# Patient Record
Sex: MALE | Race: AMERICAN INDIAN OR ALASKA NATIVE
[De-identification: names, ages, dates, MRNs, and addresses within clinical notes are randomized per-mention and may not be internally consistent; named-entity substitution may affect disease eponyms.]

---

## 2021-12-31 ENCOUNTER — HOSPITAL ENCOUNTER (EMERGENCY)
Dept: HOSPITAL 5 - ED | Age: 46
LOS: 4 days | Discharge: STILL ADMITTED OR ADMINISTRATIVE | End: 2022-01-04
Payer: SELF-PAY

## 2021-12-31 DIAGNOSIS — U07.1: Primary | ICD-10-CM

## 2021-12-31 DIAGNOSIS — Z79.899: ICD-10-CM

## 2021-12-31 DIAGNOSIS — R45.851: ICD-10-CM

## 2021-12-31 DIAGNOSIS — E11.9: ICD-10-CM

## 2021-12-31 LAB
ALBUMIN SERPL-MCNC: 3.6 G/DL (ref 3.9–5)
ALT SERPL-CCNC: 29 UNITS/L (ref 7–56)
BASOPHILS # (AUTO): 0.1 K/MM3 (ref 0–0.1)
BASOPHILS NFR BLD AUTO: 0.7 % (ref 0–1.8)
BUN SERPL-MCNC: 17 MG/DL (ref 9–20)
BUN/CREAT SERPL: 14 %
CALCIUM SERPL-MCNC: 8.3 MG/DL (ref 8.4–10.2)
EOSINOPHIL # BLD AUTO: 0.2 K/MM3 (ref 0–0.4)
EOSINOPHIL NFR BLD AUTO: 2.3 % (ref 0–4.3)
HCT VFR BLD CALC: 37.1 % (ref 35.5–45.6)
HEMOLYSIS INDEX: 9
HGB BLD-MCNC: 12.2 GM/DL (ref 11.8–15.2)
LYMPHOCYTES # BLD AUTO: 2.2 K/MM3 (ref 1.2–5.4)
LYMPHOCYTES NFR BLD AUTO: 28.6 % (ref 13.4–35)
MCHC RBC AUTO-ENTMCNC: 33 % (ref 32–34)
MCV RBC AUTO: 86 FL (ref 84–94)
MONOCYTES # (AUTO): 0.6 K/MM3 (ref 0–0.8)
MONOCYTES % (AUTO): 7.6 % (ref 0–7.3)
PLATELET # BLD: 206 K/MM3 (ref 140–440)
RBC # BLD AUTO: 4.33 M/MM3 (ref 3.65–5.03)

## 2021-12-31 PROCEDURE — 36415 COLL VENOUS BLD VENIPUNCTURE: CPT

## 2021-12-31 PROCEDURE — 82962 GLUCOSE BLOOD TEST: CPT

## 2021-12-31 PROCEDURE — 96372 THER/PROPH/DIAG INJ SC/IM: CPT

## 2021-12-31 PROCEDURE — 85025 COMPLETE CBC W/AUTO DIFF WBC: CPT

## 2021-12-31 PROCEDURE — U0003 INFECTIOUS AGENT DETECTION BY NUCLEIC ACID (DNA OR RNA); SEVERE ACUTE RESPIRATORY SYNDROME CORONAVIRUS 2 (SARS-COV-2) (CORONAVIRUS DISEASE [COVID-19]), AMPLIFIED PROBE TECHNIQUE, MAKING USE OF HIGH THROUGHPUT TECHNOLOGIES AS DESCRIBED BY CMS-2020-01-R: HCPCS

## 2021-12-31 PROCEDURE — 80307 DRUG TEST PRSMV CHEM ANLYZR: CPT

## 2021-12-31 PROCEDURE — 80320 DRUG SCREEN QUANTALCOHOLS: CPT

## 2021-12-31 PROCEDURE — 80053 COMPREHEN METABOLIC PANEL: CPT

## 2021-12-31 PROCEDURE — 81001 URINALYSIS AUTO W/SCOPE: CPT

## 2021-12-31 PROCEDURE — G0480 DRUG TEST DEF 1-7 CLASSES: HCPCS

## 2021-12-31 PROCEDURE — 99284 EMERGENCY DEPT VISIT MOD MDM: CPT

## 2021-12-31 NOTE — EMERGENCY DEPARTMENT REPORT
ED Psych HPI





- General


Chief Complaint: Psych


Stated Complaint: SUICIDAL


Time Seen by Provider: 12/31/21 20:47


Source: EMS


Mode of arrival: Stretcher





- History of Present Illness


Initial Comments: 





Patient came in by EMS due to suicidal ideation.  He states that he is been off 

of his Prozac for 3 to 4 months.  He took 40 mg a day.  He had just not been 

compliant.  He is contemplating suicide and has been for 2 to 3 days.  Tonight, 

he plan to get a bunch of "heroin and cocaine and mix it together and just go to

sleep and not wake up."  He has tried suicide before.  He attempted to hang 

himself years ago.  He has had family members that have tried to kill himself.  

He is not hearing voices that are telling him to harm himself.  He does admit 

that he was at [facility] for the same.  He was released at 

approximately 3:00 today.  He states that they "just put him in a room and then 

kicked him out."





ED Review of Systems


ROS: 


Stated complaint: SUICIDAL


Other details as noted in HPI





Comment: All other systems reviewed and negative


Constitutional: denies: fever


Eyes: denies: eye pain


ENT: denies: throat pain


Respiratory: denies: cough


Cardiovascular: denies: chest pain


Endocrine: denies: unexplained weight loss


Gastrointestinal: denies: abdominal pain


Genitourinary: denies: dysuria


Musculoskeletal: denies: back pain


Skin: denies: rash


Neurological: denies: headache


Psychiatric: as per HPI


Hematological/Lymphatic: denies: easy bruising





ED Past Medical Hx





- Past Medical History


Hx Diabetes: Yes


Hx Psychiatric Treatment: Yes





- Family History


Family history: diabetes, other (Psychiatric disease)





ED Physical Exam





- General


Limitations: No Limitations, Other (Pulse ox noted and normal per EMS)


General appearance: alert, in no apparent distress





- Head


Head exam: Present: atraumatic, normocephalic





- Eye


Eye exam: Present: normal appearance, EOMI





- ENT


ENT exam: Present: normal orophraynx, normal external ear exam





- Neck


Neck exam: Present: normal inspection.  Absent: meningismus





- Respiratory


Respiratory exam: Present: normal lung sounds bilaterally.  Absent: respiratory 

distress





- Cardiovascular


Cardiovascular Exam: Present: regular rate, normal rhythm





- GI/Abdominal


GI/Abdominal exam: Present: soft.  Absent: tenderness





- Extremities Exam


Extremities exam: Present: normal capillary refill.  Absent: calf tenderness





- Back Exam


Back exam: Absent: CVA tenderness (R), CVA tenderness (L)





- Neurological Exam


Neurological exam: Present: alert, oriented X3, CN II-XII intact, normal gait.  

Absent: motor sensory deficit





- Psychiatric


Psychiatric exam: Present: normal affect, normal mood, suicidal ideation, other 

(Good eye contact)





- Skin


Skin exam: Present: warm, dry





ED Course





- Reevaluation(s)


Reevaluation #1: 





12/31/21 20:48


EMS was met upon arrival.  Labs were ordered.  Old records noted.


Reevaluation #2: 





12/31/21 22:07


Labs are noted.  Case was discussed with psychiatric services.  They believe 

that admission is appropriate.  1013 and coronavirus testing have been 

completed.





ED Medical Decision Making





- Lab Data


Result diagrams: 


                                 12/31/21 20:54





                                 12/31/21 20:54





- Medical Decision Making





Patient presented with suicidal ideation and intent.  He has been placed on a 1

013.  Has been medically cleared.  Psychiatric services will attempt placement. 

There is no evidence of acute delusion or psychosis.


Critical Care Time: No


Critical care attestation.: 


If time is entered above; I have spent that time in minutes in the direct care 

of this critically ill patient, excluding procedure time.








ED Disposition


Clinical Impression: 


 Suicidal ideation





Disposition: 30 STILL A PATIENT


Is pt being admited?: No


Condition: Stable

## 2022-01-01 LAB
BILIRUB UR QL STRIP: (no result)
BLOOD UR QL VISUAL: (no result)
PH UR STRIP: 7 [PH] (ref 5–7)
PROT UR STRIP-MCNC: (no result) MG/DL
RBC #/AREA URNS HPF: < 1 /HPF (ref 0–6)
UROBILINOGEN UR-MCNC: < 2 MG/DL (ref ?–2)
WBC #/AREA URNS HPF: 1 /HPF (ref 0–6)

## 2022-01-01 RX ADMIN — DOXEPIN HYDROCHLORIDE SCH MG: 10 CAPSULE ORAL at 22:55

## 2022-01-01 NOTE — EMERGENCY DEPARTMENT REPORT
Blank Doc





- Documentation


Documentation: 


Chart and nurses notes reviewed


46-year-old male with bipolar disorder on 1013 for psychosis with homicidal and 

suicidal ideation.  Covid test result pending.  No events overnight.  Vital 

signs stable.  P.o. meds initiated by mental health and placement pending.

## 2022-01-01 NOTE — CONSULTATION
History of Present Illness





- Reason for Consult


Consult date: 01/01/22


Reason for consult: suicidal





- History of Present Psychiatric Illness


The patient was seen today. He is a 47y/o male patient who presented to the ER 

for suicidal thoughts over last 3 days. He says he's been off of his meds for 

about three weeks. He could only remember the prozac. He says the suicidal 

thoughts are getting stronger. The patient says he hasn't been sleeping and has 

nightmares. He says he's hearing voices telling him to kill himself and others. 

The patient says he's seeing dead people. He says he has a history of doing 

cocaine but hadn't done it in about  a month. The patient says he's homeless and

unemployed. He says he's trying to get his disability. 





REVIEW OF SYSTEMS  


Constitutional: Negative for weight loss


ENT: Negative for stridor


Respiratory: Negative for cough or hemoptysis


All other systems reviewed and are negative





MENTAL STATUS EXAMINATION


General Appearance and Behavior: Age appropriate, good hygiene, wearing 

appropriate clothes. calm, cooperative


Cooperation: Cooperative


Psychomotor Behavior: Psychomotor normal


Mood: Depressed


Affect and affective range: congruent with stated mood


Thought Process: goal directed


Thought Content: Depression, hallucinations


Speech: Normal tone and pace


Suicidal Ideation: Yes


Homicidal Ideation: Denies


Hallucinations: Auditory/Visual 


Delusions: None elicited


Impulse Control: Limited


Insight and Judgment: Limited insight and fair judgment


Memory: normal


Attention: Attentive


Orientation: a/o x 3





 Assessment 


(1) Bipolar Disorder





Treatment Plan


1013


Risperidone 0.25mg po BID


Doxepin 10mg po qhs


Prozac 40mg po daily


Disposition: Recommend acute psychiatric inpatient treatment


Will follow. Thanks


Case staffed with Dr. Messer








Medications and Allergies


                                    Allergies











Allergy/AdvReac Type Severity Reaction Status Date / Time


 


No Known Allergies Allergy   Verified 12/31/21 22:22











                                Home Medications











 Medication  Instructions  Recorded  Confirmed  Last Taken  Type


 


FLUoxetine HCL [PROzac] 40 mg PO QDAY 01/01/22 01/01/22 Unknown History














Mental Status Exam





- Vital signs


                                Last Vital Signs











Temp  98.4 F   01/01/22 09:39


 


Pulse  98 H  01/01/22 09:39


 


Resp  18   01/01/22 09:39


 


BP  110/54   01/01/22 09:39


 


Pulse Ox  98   01/01/22 09:39














Results


Result Diagrams: 


                                 12/31/21 20:54





                                 12/31/21 20:54


                              Abnormal lab results











  12/31/21 12/31/21 Range/Units





  20:54 20:54 


 


Mono % (Auto)  7.6 H   (0.0-7.3)  %


 


Glucose   303 H  ()  mg/dL


 


Calcium   8.3 L  (8.4-10.2)  mg/dL


 


Albumin   3.6 L  (3.9-5)  g/dL








All other labs normal.

## 2022-01-02 RX ADMIN — DOXEPIN HYDROCHLORIDE SCH MG: 10 CAPSULE ORAL at 22:18

## 2022-01-02 NOTE — PROGRESS NOTE
Subjective





- Reason for Consult


Consult date: 01/02/22


Reason for consult: SI





- Chief Complaint


Chief complaint: 


The patient was seen today. He says he is depressed, and still having suicidal 

thoughts. The patient also verbalizes seeing dead people. 





REVIEW OF SYSTEMS  


Constitutional: Negative for weight loss


ENT: Negative for stridor


Respiratory: Negative for cough or hemoptysis


All other systems reviewed and are negative





MENTAL STATUS EXAMINATION


General Appearance and Behavior: Age appropriate, good hygiene, wearing 

appropriate clothes. calm, cooperative


Cooperation: Cooperative


Psychomotor Behavior: Psychomotor normal


Mood: Depressed


Affect and affective range: congruent with stated mood


Thought Process: goal directed


Thought Content: Depression, hallucinations


Speech: Normal tone and pace


Suicidal Ideation: Yes


Homicidal Ideation: Denies


Hallucinations: Auditory/Visual 


Delusions: None elicited


Impulse Control: Limited


Insight and Judgment: Limited insight and fair judgment


Memory: normal


Attention: Attentive


Orientation: a/o x 3





 Assessment 


(1) Bipolar Disorder





Treatment Plan


1013


Increase Risperidone 0.50mg po BID


Continue Doxepin 10mg po qhs


Continue Prozac 40mg po daily


Disposition: Recommend acute psychiatric inpatient treatment


Will follow. Thanks


Case staffed with Dr. Messer








Mental Status Exam





- Vital signs


                                Last Vital Signs











Temp  98.1 F   01/01/22 19:57


 


Pulse  89   01/01/22 19:57


 


Resp  18   01/01/22 19:57


 


BP  111/51   01/01/22 19:57


 


Pulse Ox  96   01/01/22 19:57

## 2022-01-02 NOTE — EVENT NOTE
Date: 01/02/22





Patient is 46 years old male with history of suicidal ideation.


Vital signs stable.


Labs reviewed and is unremarkable.


Waiting for inpatient psychiatric admission.

## 2022-01-03 RX ADMIN — INSULIN HUMAN SCH UNITS: 100 INJECTION, SOLUTION PARENTERAL at 14:42

## 2022-01-03 RX ADMIN — GABAPENTIN SCH MG: 300 CAPSULE ORAL at 15:29

## 2022-01-03 RX ADMIN — INSULIN HUMAN SCH UNITS: 100 INJECTION, SOLUTION PARENTERAL at 16:20

## 2022-01-03 RX ADMIN — INSULIN HUMAN SCH: 100 INJECTION, SOLUTION PARENTERAL at 23:16

## 2022-01-03 RX ADMIN — GABAPENTIN SCH MG: 300 CAPSULE ORAL at 22:14

## 2022-01-03 RX ADMIN — GABAPENTIN SCH MG: 300 CAPSULE ORAL at 12:13

## 2022-01-03 NOTE — PROGRESS NOTE
Subjective





- Reason for Consult


Consult date: 01/03/22


Reason for consult: SI





- Chief Complaint


Chief complaint: 


The patient was seen today. He says he is still depressed, and still having 

suicidal thoughts. He says he has a plan to OD. The patient denies 

hallucinations of any kind today. He says he slept on and off. 





REVIEW OF SYSTEMS  


Constitutional: Negative for weight loss


ENT: Negative for stridor


Respiratory: Negative for cough or hemoptysis


All other systems reviewed and are negative





MENTAL STATUS EXAMINATION


General Appearance and Behavior: Age appropriate, good hygiene, wearing 

appropriate clothes. calm, cooperative


Cooperation: Cooperative


Psychomotor Behavior: Psychomotor normal


Mood: Depressed


Affect and affective range: congruent with stated mood


Thought Process: goal directed


Thought Content: Depression, hallucinations


Speech: Normal tone and pace


Suicidal Ideation: Yes


Homicidal Ideation: Denies


Hallucinations: Auditory/Visual 


Delusions: None elicited


Impulse Control: Limited


Insight and Judgment: Limited insight and fair judgment


Memory: normal


Attention: Attentive


Orientation: a/o x 3





 Assessment 


(1) Bipolar Disorder





Treatment Plan


1013


Risperidone 0.50mg po BID


Increase Doxepin 25mg po qhs


Continue Prozac 40mg po daily


Disposition: Recommend acute psychiatric inpatient treatment


Will follow. Thanks


Case staffed with Dr. Messer








Mental Status Exam





- Vital signs


                                Last Vital Signs











Temp  98.2 F   01/03/22 08:00


 


Pulse  96 H  01/03/22 08:00


 


Resp  18   01/03/22 08:00


 


BP  107/67   01/03/22 08:00


 


Pulse Ox  98   01/03/22 08:00

## 2022-01-03 NOTE — EMERGENCY DEPARTMENT REPORT
Blank Doc





- Documentation


Documentation: 





46-year-old male on 1013 for suicidal ideation with plan to overdose.  Covid p

ositive.    Awaiting placement.  Compliant with p.o. medications.  Patient 

complained of needing medication for his diabetes and symptoms of neuropathy and

therefore Metformin, sliding scale insulin, and gabapentin were initiated by Dr. Nicole this am

## 2022-01-04 ENCOUNTER — HOSPITAL ENCOUNTER (EMERGENCY)
Dept: HOSPITAL 5 - ED | Age: 47
Discharge: HOME | End: 2022-01-04
Payer: SELF-PAY

## 2022-01-04 VITALS — DIASTOLIC BLOOD PRESSURE: 82 MMHG | SYSTOLIC BLOOD PRESSURE: 122 MMHG

## 2022-01-04 VITALS — SYSTOLIC BLOOD PRESSURE: 90 MMHG | DIASTOLIC BLOOD PRESSURE: 56 MMHG

## 2022-01-04 DIAGNOSIS — U07.1: Primary | ICD-10-CM

## 2022-01-04 DIAGNOSIS — E11.9: ICD-10-CM

## 2022-01-04 PROCEDURE — 99283 EMERGENCY DEPT VISIT LOW MDM: CPT

## 2022-01-04 PROCEDURE — 71046 X-RAY EXAM CHEST 2 VIEWS: CPT

## 2022-01-04 PROCEDURE — 36415 COLL VENOUS BLD VENIPUNCTURE: CPT

## 2022-01-04 PROCEDURE — 93005 ELECTROCARDIOGRAM TRACING: CPT

## 2022-01-04 PROCEDURE — 84484 ASSAY OF TROPONIN QUANT: CPT

## 2022-01-04 RX ADMIN — INSULIN HUMAN SCH UNITS: 100 INJECTION, SOLUTION PARENTERAL at 09:19

## 2022-01-04 RX ADMIN — GABAPENTIN SCH MG: 300 CAPSULE ORAL at 09:27

## 2022-01-04 NOTE — PROGRESS NOTE
Subjective





- Reason for Consult


Consult date: 01/04/22


Reason for consult: agitation





- Chief Complaint


Chief complaint: 


The patient was seen today. He is calm and cooperative. He says he's doing 

alright. His symptoms are vague. The patient says he didn't sleep well last 

night due to bad dreams. He denies hallucinations. The patient says he's trying 

to get back to his family in another state. He says he spoke with his mother and

she told him to see how his stay at the hospital plays out. When asked about 

SI/HI, the patient says "a little, but not really." when asking the patient why 

did he says he was suicidal a little, he replied "I have bad dreams." He denies 

having a plan. He says he is homeless and has not plans but to go back to his 

home state after leaving here. 





REVIEW OF SYSTEMS  


Constitutional: Negative for weight loss


ENT: Negative for stridor


Respiratory: Negative for cough or hemoptysis


All other systems reviewed and are negative





MENTAL STATUS EXAMINATION


General Appearance and Behavior: Age appropriate, good hygiene, wearing 

appropriate clothes. calm, cooperative


Cooperation: Cooperative


Psychomotor Behavior: Psychomotor normal


Mood: alright


Affect and affective range: congruent with stated mood


Thought Process: goal directed


Thought Content: None


Speech: Normal tone and pace


Suicidal Ideation: a little but not really


Homicidal Ideation: Denies


Hallucinations: Denies


Delusions: None elicited


Impulse Control: Limited


Insight and Judgment: Limited insight and fair judgment


Memory: normal


Attention: Attentive


Orientation: a/o x 3





 Assessment 


(1) Bipolar Disorder





Treatment Plan


d/c 1013


Risperidone 0.50mg po BID


Doxepin 25mg po qhs


Prozac 40mg po daily


Disposition: Do not Recommend acute psychiatric inpatient treatment. The patient

understands that if SI/HI or any fear of endangerment arise he is to seek 

immediate assistance.


The  to give the patient all necessary outpatient resources 


The patient is to follow up with outpatient psych in 7 to 14 days upon 

discharge. 


Will sign off. Thanks


Case staffed with Dr. Messer





Mental Status Exam





- Vital signs


                                Last Vital Signs











Temp  98.4 F   01/04/22 05:31


 


Pulse  84   01/04/22 05:31


 


Resp  18   01/04/22 05:33


 


BP  90/56   01/04/22 05:31


 


Pulse Ox  96   01/04/22 05:33

## 2022-01-04 NOTE — XRAY REPORT
CHEST 2 VIEWS 



INDICATION / CLINICAL INFORMATION:

Cough/+COVID.





FINDINGS:



SUPPORT DEVICES: None.



HEART / MEDIASTINUM: No significant abnormality. 



LUNGS / PLEURA: No significant pulmonary or pleural abnormality. No pneumothorax. 



ADDITIONAL FINDINGS: No significant additional findings.



IMPRESSION:

1. No acute findings.



Signer Name: Jackson Nix MD 

Signed: 1/4/2022 7:00 PM

Workstation Name: [id number]

## 2022-01-04 NOTE — EMERGENCY DEPARTMENT REPORT
Blank Doc





- Documentation


Documentation: 





Patient presents secondary to suicidal thoughts.  He is feeling better today.  

He states that he does not feel nearly as badly as he did.  Psychiatric 

evaluation and disposition are still pending.  They were looking at admission 

and placement.  As he is feeling better, that could certainly change today.  We 

will await psychiatric evaluation today and ultimate disposition.

## 2022-01-04 NOTE — EMERGENCY DEPARTMENT REPORT
ED General Adult HPI





- General


Chief complaint: Nausea/Vomiting/Diarrhea


Stated complaint: COVID, CHEST PAIN, COUGH


Time Seen by Provider: 22 18:16


Source: patient


Mode of arrival: Ambulatory


Limitations: No Limitations





- History of Present Illness


Initial comments: 


46-year-old male with a past medical history of diabetes presents to the ER 

today with complaints of Covid symptoms and left-sided chest pain.  Patient 

states that he has had symptoms for 3 weeks.  He reports cough, hoarseness, 

diaphoresis, diarrhea, is also been having left-sided chest pain, which she 

states is more so with the cough, and subjective fever.  He states that he took 

a COVID-19 test last week Thursday and it was positive.  He denies any shortness

of breath, calf pain, lower extremity swelling, or any additional symptoms.  He 

denies tobacco use.  He denies any illicit drug use.  He denies alcohol abuse.  

He states that he did not get a COVID-19 vaccine.


MD Complaint: COVID+ with covid symptoms


-: week(s)





- Related Data


                                Home Medications











 Medication  Instructions  Recorded  Confirmed  Last Taken


 


HumuLIN R  22  Unknown


 


metFORMIN [Glucophage] 500 mg PO BID 22 Unknown








                                  Previous Rx's











 Medication  Instructions  Recorded  Last Taken  Type


 


Benzonatate [Tessalon Perles] 100 mg PO Q8HR PRN #30 capsule 22 Unknown Rx


 


Doxepin [SINEquan] 25 mg PO QHS #30 capsule 22 Unknown Rx


 


FLUoxetine HCL [PROzac] 40 mg PO QDAY #30 cap 22 Unknown Rx


 


Ibuprofen [Motrin] 600 mg PO Q8H PRN #30 tablet 22 Unknown Rx


 


risperiDONE [RisperDAL] 0.5 mg PO BID #60 tablet 22 Unknown Rx











                                    Allergies











Allergy/AdvReac Type Severity Reaction Status Date / Time


 


No Known Allergies Allergy   Verified 21 22:22














ED Review of Systems


ROS: 


Stated complaint: COVID, CHEST PAIN, COUGH


Other details as noted in HPI





Comment: All other systems reviewed and negative


Constitutional: denies: chills, fever


Eyes: denies: eye pain, eye discharge, vision change


ENT: denies: ear pain, throat pain


Respiratory: denies: cough, shortness of breath, SOB at rest, wheezing


Cardiovascular: chest pain.  denies: palpitations, dyspnea on exertion, 

paroxysmal nocturnal dyspnea


Gastrointestinal: diarrhea.  denies: abdominal pain, nausea, vomiting, 

constipation, hematemesis, hematochezia


Genitourinary: denies: urgency, dysuria, hematuria, discharge, testicular pain, 

testicular mass


Musculoskeletal: denies: back pain, joint swelling, arthralgia, myalgia


Skin: denies: rash, lesions, change in color, change in hair/nails, pruritus


Neurological: denies: headache, weakness, numbness, paresthesias, confusion, 

abnormal gait, vertigo


Psychiatric: denies: anxiety, depression, auditory hallucinations, visual 

hallucinations, homicidal thoughts, suicidal thoughts


Hematological/Lymphatic: denies: easy bleeding, easy bruising, swollen glands





ED Past Medical Hx





- Past Medical History


Hx Diabetes: Yes


Hx Psychiatric Treatment: Yes





- Social History


Smoking Status: Never Smoker


Substance Use Type: None





- Medications


Home Medications: 


                                Home Medications











 Medication  Instructions  Recorded  Confirmed  Last Taken  Type


 


HumuLIN R  22  Unknown History


 


metFORMIN [Glucophage] 500 mg PO BID 22 Unknown History


 


Benzonatate [Tessalon Perles] 100 mg PO Q8HR PRN #30 capsule 22  Unknown 

Rx


 


Doxepin [SINEquan] 25 mg PO QHS #30 capsule 22  Unknown Rx


 


FLUoxetine HCL [PROzac] 40 mg PO QDAY #30 cap 22  Unknown Rx


 


Ibuprofen [Motrin] 600 mg PO Q8H PRN #30 tablet 22  Unknown Rx


 


risperiDONE [RisperDAL] 0.5 mg PO BID #60 tablet 22  Unknown Rx














ED Physical Exam





- General


Limitations: No Limitations


General appearance: alert, in no apparent distress





- Head


Head exam: Present: atraumatic, normocephalic, normal inspection





- Eye


Eye exam: Present: normal appearance, PERRL, EOMI


Pupils: Present: normal accommodation





- ENT


ENT exam: Present: normal exam, mucous membranes moist, TM's normal bilaterally





- Neck


Neck exam: Present: normal inspection, full ROM.  Absent: meningismus





- Respiratory


Respiratory exam: Present: normal lung sounds bilaterally.  Absent: respiratory 

distress, wheezes, rales, rhonchi, stridor





- Cardiovascular


Cardiovascular Exam: Present: regular rate, normal rhythm, normal heart sounds





- GI/Abdominal


GI/Abdominal exam: Present: soft.  Absent: distended, tenderness, guarding, 

rebound





- Extremities Exam


Extremities exam: Present: normal inspection, full ROM, normal capillary refill.

  Absent: pedal edema, calf tenderness





- Neurological Exam


Neurological exam: Present: alert, oriented X3, CN II-XII intact, normal gait





- Psychiatric


Psychiatric exam: Present: normal affect, normal mood





- Skin


Skin exam: Present: intact





ED Course


                                   Vital Signs











  22





  16:57


 


Temperature 98.7 F


 


Pulse Rate 98 H


 


Respiratory 17





Rate 


 


Blood Pressure 122/82


 


O2 Sat by Pulse 100





Oximetry 














ED Medical Decision Making





- EKG Data


EKG shows normal: sinus rhythm


Rate: normal (84)





- EKG Data


Interpretation: normal EKG





- Radiology Data


Radiology results: report reviewed


Patient: FILIBERTO COLINDRES                                                        

        MR#: [id number]  


[id number]          


: 1975                                                                

Acct:[id number]      


 


Age/Sex: 46 / M                                                                

ADM Date: 22     


 


Loc: ED       


Attending Dr:   


 


 


Ordering Physician: SUKHDEV MAGAÑA  


Date of Service: 22  


Procedure(s): XR chest routine 2V  


Accession Number(s): [id number]  


 


cc: SUKHDEV MAGAÑA   


 


Fluoro Time In Minutes:   


 


CHEST 2 VIEWS   


 


 INDICATION / CLINICAL INFORMATION:  


 Cough/+COVID.  


 


 


 FINDINGS:  


 


 SUPPORT DEVICES: None.  


 


 HEART / MEDIASTINUM: No significant abnormality.   


 


 LUNGS / PLEURA: No significant pulmonary or pleural abnormality. No 

pneumothorax.   


 


 ADDITIONAL FINDINGS: No significant additional findings.  


 


 IMPRESSION:  


 1. No acute findings.  


 


 Signer Name: Jackson Nix MD   


 Signed: 2022 7:00 PM  


 Workstation Name: [id number]   


 


 


Transcribed By: BC  


Dictated By: Jackson Nix MD  


Electronically Authenticated By: Jackson Nix MD    


Signed Date/Time: 22 190                                


 


 


 


DD/DT: 22                                                            

  


TD/TT:








- Medical Decision Making


Chest x-ray shows nothing acute.  Troponin is negative.  EKG shows no acute 

ischemic changes, STEMI or significant dysrhythmias.  


Patient is currently laying in the bed in the room, on his phone watching TV.  

He is not in any acute distress.  He is not toxic or ill-appearing.  He is not 

in any respiratory distress.  He has no meningeal signs on exam.  Abdomen soft 

and nontender.  He has no lower extremity swelling or calf pain.  He appears to 

be hemodynamically stable.  His vital signs are stable.  I do not suspect 

unstable angina (Heart Score 2) , PE (PERC 0), aortic dissection, sepsis, or any

 other significant emergent conditions warranting additional testing or 

admission at this time.  Discussed results with patient.  Informed that his 

symptoms are likely related to Covid, the pain in his chest likely related to 

his coughing and and at this time is just symptomatic treatment and continue to 

quarantine.  Patient expressed understanding of all instructions and agree with 

plan.  Patient was discharged in stable conditions.


Critical care attestation.: 


If time is entered above; I have spent that time in minutes in the direct care 

of this critically ill patient, excluding procedure time.








ED Disposition


Clinical Impression: 


 COVID-19 virus infection, Nonspecific chest pain





Disposition:  HOME / SELF CARE / HOMELESS


Is pt being admited?: No


Does the pt Need Aspirin: No


Condition: Stable


Instructions:  COVID-19, Nonspecific Chest Pain, Adult, Easy-to-Read


Additional Instructions: 


Take the ibuprofen as prescribed to help with any pain.  Take the Tessalon 

Perles to help with any cough.  You can take Imodium from over-the-counter to 

help with diarrhea.  Main thing is you need to remain hydrated by drinking lots 

of fluids.  You will need to quarantine for 5 days since you are COVID-19 

positive.  Protect others by wearing a mask at all times.  Follow-up with your 

primary care doctor.  Return to the ER if your symptoms worsens in any way.


Prescriptions: 


Ibuprofen [Motrin] 600 mg PO Q8H PRN #30 tablet


 PRN Reason: Pain


Benzonatate [Tessalon Perles] 100 mg PO Q8HR PRN #30 capsule


 PRN Reason: Cough


Referrals: 


PRIMARY CARE,MD [Primary Care Provider] - 3-5 Days


Time of Disposition: 19:41





HEART Score





- HEART Score


History: Slightly suspicious


EKG: Normal


Age: 45-65


Risk factors: 1-2 risk factors


Troponin: 





                                        











Troponin T  < 0.010 ng/mL (0.00-0.029)   22  18:56    











Troponin: < normal limit


HEART Score: 2





- Critical Actions


Critical Actions: 0-3 pts:0.9-1.7%risk of adverse cardiac event.Candidate for 

discharge

## 2022-01-05 ENCOUNTER — HOSPITAL ENCOUNTER (EMERGENCY)
Dept: HOSPITAL 5 - ED | Age: 47
LOS: 1 days | Discharge: HOME | End: 2022-01-06
Payer: SELF-PAY

## 2022-01-05 DIAGNOSIS — R45.851: ICD-10-CM

## 2022-01-05 DIAGNOSIS — R45.850: ICD-10-CM

## 2022-01-05 DIAGNOSIS — E11.65: ICD-10-CM

## 2022-01-05 DIAGNOSIS — E11.8: ICD-10-CM

## 2022-01-05 DIAGNOSIS — U07.1: Primary | ICD-10-CM

## 2022-01-05 LAB
ALBUMIN SERPL-MCNC: 3.8 G/DL (ref 3.9–5)
ALT SERPL-CCNC: 18 UNITS/L (ref 7–56)
BASOPHILS # (AUTO): 0.1 K/MM3 (ref 0–0.1)
BASOPHILS NFR BLD AUTO: 0.7 % (ref 0–1.8)
BILIRUB UR QL STRIP: (no result)
BLOOD UR QL VISUAL: (no result)
BUN SERPL-MCNC: 19 MG/DL (ref 9–20)
BUN/CREAT SERPL: 21 %
CALCIUM SERPL-MCNC: 9 MG/DL (ref 8.4–10.2)
EOSINOPHIL # BLD AUTO: 0.3 K/MM3 (ref 0–0.4)
EOSINOPHIL NFR BLD AUTO: 2.5 % (ref 0–4.3)
HCT VFR BLD CALC: 43.2 % (ref 35.5–45.6)
HEMOLYSIS INDEX: 12
HGB BLD-MCNC: 14.1 GM/DL (ref 11.8–15.2)
LYMPHOCYTES # BLD AUTO: 2.2 K/MM3 (ref 1.2–5.4)
LYMPHOCYTES NFR BLD AUTO: 21.7 % (ref 13.4–35)
MCHC RBC AUTO-ENTMCNC: 33 % (ref 32–34)
MCV RBC AUTO: 86 FL (ref 84–94)
MONOCYTES # (AUTO): 0.7 K/MM3 (ref 0–0.8)
MONOCYTES % (AUTO): 7.1 % (ref 0–7.3)
MUCOUS THREADS #/AREA URNS HPF: (no result) /HPF
PH UR STRIP: 5 [PH] (ref 5–7)
PLATELET # BLD: 310 K/MM3 (ref 140–440)
PROT UR STRIP-MCNC: (no result) MG/DL
RBC # BLD AUTO: 5.05 M/MM3 (ref 3.65–5.03)
RBC #/AREA URNS HPF: 1 /HPF (ref 0–6)
UROBILINOGEN UR-MCNC: < 2 MG/DL (ref ?–2)
WBC #/AREA URNS HPF: 1 /HPF (ref 0–6)

## 2022-01-05 PROCEDURE — 81001 URINALYSIS AUTO W/SCOPE: CPT

## 2022-01-05 PROCEDURE — G0480 DRUG TEST DEF 1-7 CLASSES: HCPCS

## 2022-01-05 PROCEDURE — 36415 COLL VENOUS BLD VENIPUNCTURE: CPT

## 2022-01-05 PROCEDURE — 80307 DRUG TEST PRSMV CHEM ANLYZR: CPT

## 2022-01-05 PROCEDURE — 80053 COMPREHEN METABOLIC PANEL: CPT

## 2022-01-05 PROCEDURE — 96360 HYDRATION IV INFUSION INIT: CPT

## 2022-01-05 PROCEDURE — 82962 GLUCOSE BLOOD TEST: CPT

## 2022-01-05 PROCEDURE — 80320 DRUG SCREEN QUANTALCOHOLS: CPT

## 2022-01-05 PROCEDURE — 85025 COMPLETE CBC W/AUTO DIFF WBC: CPT

## 2022-01-05 PROCEDURE — 99284 EMERGENCY DEPT VISIT MOD MDM: CPT

## 2022-01-05 RX ADMIN — INSULIN HUMAN SCH UNITS: 100 INJECTION, SOLUTION PARENTERAL at 16:17
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
## 2022-01-05 NOTE — ELECTROCARDIOGRAPH REPORT
[facility]

                                       

Test Date:    2022               Test Time:    19:26:50

Pat Name:     FILIBERTO COLINDRES          Department:   

Patient ID:   [id number]          Room:          

Gender:       M                        Technician:   

:          [id number]               Requested By: SUKHDEV MAGAÑA

Order Number: [id number]              Reading MD:   Ruth Hightower

                                 Measurements

Intervals                              Axis          

Rate:         84                       P:            76

MT:           160                      QRS:          74

QRSD:         80                       T:            48

QT:           341                                    

QTc:          404                                    

                           Interpretive Statements

Sinus rhythm

Normal ECG

No previous ECG available for comparison

Electronically Signed On 2022 13:21:09 EST by Ruth Hightower

## 2022-01-06 VITALS — SYSTOLIC BLOOD PRESSURE: 124 MMHG | DIASTOLIC BLOOD PRESSURE: 67 MMHG

## 2022-01-06 RX ADMIN — INSULIN HUMAN SCH: 100 INJECTION, SOLUTION PARENTERAL at 06:43
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
## 2022-01-06 NOTE — EMERGENCY DEPARTMENT REPORT
Blank Doc





- Documentation


Documentation: 





Patient was seen with anger management issues as well as other behavioral conc

erns and psychiatric problems.  He was medically cleared.  Psychiatric services 

has seen the patient.  He is not actively suicidal, homicidal, or psychotic.  

Discharge is appropriate.  He was discharged as written by behavioral health 

with outpatient recommendations.  He is amenable to this plan.

## 2022-01-07 ENCOUNTER — HOSPITAL ENCOUNTER (EMERGENCY)
Dept: HOSPITAL 5 - ED | Age: 47
Discharge: HOME | End: 2022-01-07
Payer: SELF-PAY

## 2022-01-07 VITALS — DIASTOLIC BLOOD PRESSURE: 72 MMHG | SYSTOLIC BLOOD PRESSURE: 119 MMHG

## 2022-01-07 DIAGNOSIS — E11.65: Primary | ICD-10-CM

## 2022-01-07 PROCEDURE — 82962 GLUCOSE BLOOD TEST: CPT

## 2022-01-07 PROCEDURE — 99282 EMERGENCY DEPT VISIT SF MDM: CPT
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
## 2022-01-08 ENCOUNTER — HOSPITAL ENCOUNTER (EMERGENCY)
Dept: HOSPITAL 5 - ED | Age: 47
LOS: 3 days | Discharge: HOME | End: 2022-01-11
Payer: SELF-PAY

## 2022-01-08 DIAGNOSIS — Z90.89: ICD-10-CM

## 2022-01-08 DIAGNOSIS — Z20.822: ICD-10-CM

## 2022-01-08 DIAGNOSIS — F32.9: Primary | ICD-10-CM

## 2022-01-08 DIAGNOSIS — E11.9: ICD-10-CM

## 2022-01-08 DIAGNOSIS — Z79.899: ICD-10-CM

## 2022-01-08 LAB
BASOPHILS # (AUTO): 0.1 K/MM3 (ref 0–0.1)
BASOPHILS NFR BLD AUTO: 0.9 % (ref 0–1.8)
EOSINOPHIL # BLD AUTO: 0.3 K/MM3 (ref 0–0.4)
EOSINOPHIL NFR BLD AUTO: 2.6 % (ref 0–4.3)
HCT VFR BLD CALC: 33.5 % (ref 35.5–45.6)
HGB BLD-MCNC: 11.2 GM/DL (ref 11.8–15.2)
LYMPHOCYTES # BLD AUTO: 3.5 K/MM3 (ref 1.2–5.4)
LYMPHOCYTES NFR BLD AUTO: 28.5 % (ref 13.4–35)
MCHC RBC AUTO-ENTMCNC: 34 % (ref 32–34)
MCV RBC AUTO: 85 FL (ref 84–94)
MONOCYTES # (AUTO): 0.9 K/MM3 (ref 0–0.8)
MONOCYTES % (AUTO): 7.2 % (ref 0–7.3)
PLATELET # BLD: 259 K/MM3 (ref 140–440)
RBC # BLD AUTO: 3.93 M/MM3 (ref 3.65–5.03)

## 2022-01-08 PROCEDURE — 80053 COMPREHEN METABOLIC PANEL: CPT

## 2022-01-08 PROCEDURE — 80320 DRUG SCREEN QUANTALCOHOLS: CPT

## 2022-01-08 PROCEDURE — U0003 INFECTIOUS AGENT DETECTION BY NUCLEIC ACID (DNA OR RNA); SEVERE ACUTE RESPIRATORY SYNDROME CORONAVIRUS 2 (SARS-COV-2) (CORONAVIRUS DISEASE [COVID-19]), AMPLIFIED PROBE TECHNIQUE, MAKING USE OF HIGH THROUGHPUT TECHNOLOGIES AS DESCRIBED BY CMS-2020-01-R: HCPCS

## 2022-01-08 PROCEDURE — 36415 COLL VENOUS BLD VENIPUNCTURE: CPT

## 2022-01-08 PROCEDURE — 82962 GLUCOSE BLOOD TEST: CPT

## 2022-01-08 PROCEDURE — 96372 THER/PROPH/DIAG INJ SC/IM: CPT

## 2022-01-08 PROCEDURE — 99284 EMERGENCY DEPT VISIT MOD MDM: CPT

## 2022-01-08 PROCEDURE — 85025 COMPLETE CBC W/AUTO DIFF WBC: CPT

## 2022-01-08 PROCEDURE — G0480 DRUG TEST DEF 1-7 CLASSES: HCPCS
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
## 2022-01-09 LAB
ALBUMIN SERPL-MCNC: 3.8 G/DL (ref 3.9–5)
ALT SERPL-CCNC: 13 UNITS/L (ref 7–56)
BUN SERPL-MCNC: 18 MG/DL (ref 9–20)
BUN/CREAT SERPL: 18 %
CALCIUM SERPL-MCNC: 8.7 MG/DL (ref 8.4–10.2)
HEMOLYSIS INDEX: 3

## 2022-01-09 NOTE — EVENT NOTE
Date: 01/09/22





46-year-old male here with suicidal ideation.  He was seen by my colleague and 

was medically clear for psychiatric evaluation and placement.  Patient is 

diabetic and was found to be hyperglycemic.  Diabetic diet, Metformin, and 

sliding scale insulin have been ordered.  Vital signs reviewed and are stable.  

No acute events overnight.  Currently awaiting inpatient psychiatric facility 

placement.

## 2022-01-10 RX ADMIN — INSULIN HUMAN SCH UNITS: 100 INJECTION, SOLUTION PARENTERAL at 12:28

## 2022-01-10 NOTE — PROGRESS NOTE
Subjective





- Reason for Consult


Consult date: 01/10/22


Reason for consult: suicidal 





- Chief Complaint


Chief complaint: 


The patient was seen today. He continue to reports having suicidal  and 

homicidal ideation. 





REVIEW OF SYSTEMS


Constitutional: Negative for weight loss


ENT: Negative for stridor


Respiratory: Negative for cough or hemoptysis


All other systems reviewed and are negative


 


MENTAL STATUS EXAMINATION


General Appearance and Behavior: Age appropriate, good hygiene, wearing 

appropriate clothes, uncooperative polite with questioning.


Cooperation: cooperative


Psychomotor Behavior: Psychomotor agitation


Mood:OK


Affect and affective range:congruent


Thought Process:Labile


Thought Content: suicidal


Speech:Normal


Intellectual Functioning: Average


Suicidal Ideation:Yes


Homicidal Ideation: Yes


hallucination: Auditory/visual


Impulse Control:Questionable


Insight and Judgment:limited insight and poor judgment


Memory: Intact


Attention:Distractible


Orientation: Alert and oriented





Diagnoses: 


(1) Major depressive disorder


1013


Case management


Treatment Plan:


Continue - Home Medications.





Patient should be compliant with medications and not to use drugs and not to dri

nk alcohol. 


PSYCHOTHERAPY: Supportive psychotherapy provided


MEDICAL: Per primary team


DELIRIUM PRECAUTIONS: Please re-orient patient frequently, keep lights on during

the day, and minimize benzodiazepines and opiates as these medications could 

worsen patient's confusion.


SAFETY SITTER: Per medical team


DISPOSITION:   Recommend acute inpatient psychiatric hospitalization at this 

time


FOLLOW-UP: Will follow. 


Thank you for the consult.  Please contact with any questions and/or concerns.











Medications and Allergies


                                        





Mental Status Exam





- Vital signs


                                Last Vital Signs











Temp  98.6 F   01/10/22 02:22


 


Pulse  78   01/10/22 02:22


 


Resp  16   01/10/22 02:22


 


BP  105/60   01/10/22 02:22


 


Pulse Ox  99   01/10/22 02:22

## 2022-01-11 VITALS — SYSTOLIC BLOOD PRESSURE: 139 MMHG | DIASTOLIC BLOOD PRESSURE: 80 MMHG

## 2022-01-11 RX ADMIN — INSULIN HUMAN SCH UNITS: 100 INJECTION, SOLUTION PARENTERAL at 07:51

## 2022-01-11 NOTE — PROGRESS NOTE
Subjective





- Reason for Consult


Consult date: 01/11/22


Reason for consult: si





- Chief Complaint


Chief complaint: 


The patient was seen today. He continue to reports having passive suicidal 

ideation.





REVIEW OF SYSTEMS


Constitutional: Negative for weight loss


ENT: Negative for stridor


Respiratory: Negative for cough or hemoptysis


All other systems reviewed and are negative


 


MENTAL STATUS EXAMINATION


General Appearance and Behavior: Age appropriate, good hygiene, wearing 

appropriate clothes, uncooperative polite with questioning.


Cooperation: cooperative


Psychomotor Behavior: Psychomotor agitation


Mood:OK


Affect and affective range:congruent


Thought Process:Labile


Thought Content: suicidal


Speech:Normal


Intellectual Functioning: Average


Suicidal Ideation:Yes


Homicidal Ideation: Denies


hallucination: Auditory/visual


Impulse Control:Questionable


Insight and Judgment:limited insight and poor judgment


Memory: Intact


Attention:Distractible


Orientation: Alert and oriented





Diagnoses: 


(1) Major depressive disorder


DC-[id number]


Case management


Treatment Plan:


Continue - Home Medications.





Patient should be compliant with medications and not to use drugs and not to 

drink alcohol. 


PSYCHOTHERAPY: Supportive psychotherapy provided


MEDICAL: Per primary team


DELIRIUM PRECAUTIONS: Please re-orient patient frequently, keep lights on during

the day, and minimize benzodiazepines and opiates as these medications could 

worsen patient's confusion.


SAFETY SITTER: Per medical team


DISPOSITION:  Do not recommend acute inpatient psychiatric hospitalization at 

this time.  will provide patient with psychiatric out patient resources.




FOLLOW-UP: Will sign off. 


Thank you for the consult.  Please contact with any questions and/or concerns.











Medications and Allergies


                                        





Mental Status Exam





- Vital signs


                                Last Vital Signs











Temp  98.6 F   01/10/22 19:48


 


Pulse  59 L  01/10/22 19:48


 


Resp  18   01/10/22 19:48


 


BP  171/88   01/10/22 19:48


 


Pulse Ox  97   01/11/22 07:58
